# Patient Record
Sex: FEMALE | Race: BLACK OR AFRICAN AMERICAN | NOT HISPANIC OR LATINO | URBAN - METROPOLITAN AREA
[De-identification: names, ages, dates, MRNs, and addresses within clinical notes are randomized per-mention and may not be internally consistent; named-entity substitution may affect disease eponyms.]

---

## 2020-07-25 ENCOUNTER — TELEPHONE ENCOUNTER (OUTPATIENT)
Dept: URBAN - METROPOLITAN AREA CLINIC 13 | Facility: CLINIC | Age: 75
End: 2020-07-25

## 2020-07-25 RX ORDER — POLYETHYLENE GLYCOL 3350, SODIUM CHLORIDE, SODIUM BICARBONATE AND POTASSIUM CHLORIDE WITH LEMON FLAVOR 420; 11.2; 5.72; 1.48 G/4L; G/4L; G/4L; G/4L
USE AS DIRECTED POWDER, FOR SOLUTION ORAL
Qty: 1 | Refills: 0 | OUTPATIENT
Start: 2013-10-17 | End: 2013-11-11

## 2020-07-26 ENCOUNTER — TELEPHONE ENCOUNTER (OUTPATIENT)
Dept: URBAN - METROPOLITAN AREA CLINIC 13 | Facility: CLINIC | Age: 75
End: 2020-07-26

## 2020-07-26 RX ORDER — SOLIFENACIN SUCCINATE 10 MG/1
TABLET, FILM COATED ORAL
Qty: 30 | Refills: 0 | Status: ACTIVE | COMMUNITY
Start: 2012-01-10

## 2020-07-26 RX ORDER — METOPROLOL TARTRATE 25 MG/1
TABLET, FILM COATED ORAL
Qty: 60 | Refills: 0 | Status: ACTIVE | COMMUNITY
Start: 2013-02-22

## 2020-07-26 RX ORDER — TEMAZEPAM 30 MG/1
TAKE 1 CAPSULE AT BEDTIME AS NEEDED CAPSULE ORAL
Qty: 90 | Refills: 0 | Status: ACTIVE | COMMUNITY
Start: 2013-09-23

## 2020-07-26 RX ORDER — LOVASTATIN 40 MG/1
TABLET ORAL
Qty: 90 | Refills: 0 | Status: ACTIVE | COMMUNITY
Start: 2012-11-09

## 2020-07-26 RX ORDER — NITROGLYCERIN 0.4 MG/1
TABLET SUBLINGUAL
Qty: 25 | Refills: 0 | Status: ACTIVE | COMMUNITY
Start: 2012-10-23

## 2020-07-26 RX ORDER — LEVOTHYROXINE SODIUM 0.07 MG/1
TAKE 1 TABLET DAILY TABLET ORAL
Qty: 90 | Refills: 0 | Status: ACTIVE | COMMUNITY
Start: 2013-09-26

## 2020-07-26 RX ORDER — METOPROLOL TARTRATE 25 MG/1
TABLET, FILM COATED ORAL
Qty: 60 | Refills: 0 | Status: ACTIVE | COMMUNITY
Start: 2012-10-23

## 2020-07-26 RX ORDER — LEVOTHYROXINE SODIUM 125 UG/1
TABLET ORAL
Qty: 90 | Refills: 0 | Status: ACTIVE | COMMUNITY
Start: 2012-07-09

## 2021-02-10 ENCOUNTER — WEB ENCOUNTER (OUTPATIENT)
Dept: URBAN - METROPOLITAN AREA CLINIC 113 | Facility: CLINIC | Age: 76
End: 2021-02-10

## 2021-02-10 ENCOUNTER — OFFICE VISIT (OUTPATIENT)
Dept: URBAN - METROPOLITAN AREA CLINIC 113 | Facility: CLINIC | Age: 76
End: 2021-02-10
Payer: MEDICARE

## 2021-02-10 VITALS
HEIGHT: 60 IN | WEIGHT: 179.4 LBS | HEART RATE: 83 BPM | DIASTOLIC BLOOD PRESSURE: 86 MMHG | TEMPERATURE: 97.1 F | BODY MASS INDEX: 35.22 KG/M2 | SYSTOLIC BLOOD PRESSURE: 174 MMHG | RESPIRATION RATE: 22 BRPM

## 2021-02-10 DIAGNOSIS — R10.84 GENERALIZED ABDOMINAL PAIN: ICD-10-CM

## 2021-02-10 DIAGNOSIS — K80.20 CALCULUS OF GALLBLADDER WITHOUT CHOLECYSTITIS WITHOUT OBSTRUCTION: ICD-10-CM

## 2021-02-10 DIAGNOSIS — R11.0 NAUSEA: ICD-10-CM

## 2021-02-10 DIAGNOSIS — K21.9 GASTROESOPHAGEAL REFLUX DISEASE WITHOUT ESOPHAGITIS: ICD-10-CM

## 2021-02-10 DIAGNOSIS — K59.09 OTHER CONSTIPATION: ICD-10-CM

## 2021-02-10 PROCEDURE — 99204 OFFICE O/P NEW MOD 45 MIN: CPT | Performed by: NURSE PRACTITIONER

## 2021-02-10 PROCEDURE — G9903 PT SCRN TBCO ID AS NON USER: HCPCS | Performed by: NURSE PRACTITIONER

## 2021-02-10 PROCEDURE — G8482 FLU IMMUNIZE ORDER/ADMIN: HCPCS | Performed by: NURSE PRACTITIONER

## 2021-02-10 PROCEDURE — G8427 DOCREV CUR MEDS BY ELIG CLIN: HCPCS | Performed by: NURSE PRACTITIONER

## 2021-02-10 RX ORDER — METOPROLOL TARTRATE 25 MG/1
TAKE 1 TABLET TABLET, FILM COATED ORAL TWICE A DAY
Refills: 0 | Status: ACTIVE | COMMUNITY
Start: 2012-10-23

## 2021-02-10 RX ORDER — DICYCLOMINE HYDROCHLORIDE 10 MG/1
1 TABLET CAPSULE ORAL
Qty: 60 | Refills: 3 | OUTPATIENT
Start: 2021-02-10 | End: 2021-06-10

## 2021-02-10 RX ORDER — GABAPENTIN 300 MG/1
1 TABLET CAPSULE ORAL TWICE A DAY
Status: ACTIVE | COMMUNITY

## 2021-02-10 RX ORDER — CLOPIDOGREL 75 MG/1
1 TABLET TABLET, FILM COATED ORAL ONCE A DAY
Status: ACTIVE | COMMUNITY

## 2021-02-10 RX ORDER — NITROGLYCERIN 0.4 MG/1
TABLET SUBLINGUAL
Qty: 25 | Refills: 0 | Status: ACTIVE | COMMUNITY
Start: 2012-10-23

## 2021-02-10 RX ORDER — LINAGLIPTIN 5 MG/1
1 TABLET TABLET, FILM COATED ORAL ONCE A DAY
Status: ACTIVE | COMMUNITY

## 2021-02-10 RX ORDER — PANTOPRAZOLE SODIUM 40 MG/1
1 TABLET TABLET, DELAYED RELEASE ORAL TWICE DAILY
Qty: 60 | Refills: 3 | OUTPATIENT

## 2021-02-10 RX ORDER — AMLODIPINE BESYLATE 5 MG/1
1 TABLET TABLET ORAL ONCE A DAY
Status: ACTIVE | COMMUNITY

## 2021-02-10 RX ORDER — TRAZODONE HYDROCHLORIDE 100 MG/1
1 TABLET AT BEDTIME TABLET, FILM COATED ORAL ONCE A DAY
Status: ACTIVE | COMMUNITY

## 2021-02-10 RX ORDER — ZOLPIDEM TARTRATE 10 MG/1
1 TABLET AT BEDTIME AS NEEDED TABLET, FILM COATED ORAL ONCE A DAY
Status: ACTIVE | COMMUNITY

## 2021-02-10 RX ORDER — LEVOTHYROXINE SODIUM 0.09 MG/1
1 TABLET IN THE MORNING ON AN EMPTY STOMACH TABLET ORAL ONCE A DAY
Refills: 0 | Status: ACTIVE | COMMUNITY
Start: 2012-07-09

## 2021-02-10 RX ORDER — RANOLAZINE 500 MG/1
1 TABLET TABLET, EXTENDED RELEASE ORAL TWICE A DAY
Status: ACTIVE | COMMUNITY

## 2021-02-10 RX ORDER — POTASSIUM CHLORIDE 1500 MG/1
1 TABLET WITH FOOD TABLET, FILM COATED, EXTENDED RELEASE ORAL ONCE A DAY
Status: ACTIVE | COMMUNITY

## 2021-02-10 RX ORDER — OXYBUTYNIN CHLORIDE 10 MG/1
1 TABLET TABLET, EXTENDED RELEASE ORAL ONCE A DAY
Status: ACTIVE | COMMUNITY

## 2021-02-10 RX ORDER — LOVASTATIN 40 MG/1
1 TABLET WITH THE EVENING MEAL TABLET ORAL ONCE A DAY
Refills: 0 | Status: ACTIVE | COMMUNITY
Start: 2012-11-09

## 2021-02-10 NOTE — PHYSICAL EXAM GASTROINTESTINAL
Abdomen , soft, tender in the epigastrium and mildly tender across the lower abdomen, nondistended , no guarding or rigidity , no masses palpable , normal bowel sounds , Liver and Spleen , no hepatosplenomegaly

## 2021-02-10 NOTE — HPI-TODAY'S VISIT:
This is a 75-year-old woman with a history of hypertension, hyperlipidemia, hypothyroidism, type 2 diabetes, coronary artery disease status post CABG (2012) on Plavix, and anxiety/depression presenting for evaluation of abdominal pain. She was last seen in 2013 for abdominal pain, nausea, and a change in bowel habits.  She had been admitted to the hospital prior to her visit for evaluation of abdominal pain and had been diagnosed with celiac artery compression syndrome.  The clinical significance of this was unclear.  She had not experienced weight loss.  An ultrasound showed gallbladder sludge but her symptoms were not typical for biliary colic.  She was instructed to take daily fiber and was scheduled for a colonoscopy.  Records are outlined below. She reports onset of abdominal pain 4 months ago.  At first, her symptoms were intermittent and are now constant.  She indicates pain across her mid abdomen at the level of her umbilicus described as "a big cramp."  This radiates across her abdomen and into her back bilaterally.  Pain severity is an 8/10.  She reports constant pain that is exacerbated by eating.  There is no change with bowel movements, position, or activity.  She reports a sensation that "food does not want to digest" as evidenced by frequent belching.  She reports regurgitation and heartburn occurring intermittently on a daily basis.  This is not always associated with meals.  She was prescribed pantoprazole 40 mg daily at an emergency department visit on 1/3.  She reports mild improvement of heartburn but no improvement of abdominal pain.  She denies food lodging in her esophagus but reports a sensation that food moves slowly causing her to belch.  She has intermittent nausea without vomiting.  This is worse postprandially.  She was prescribed medication for nausea in the emergency department but reports this symptom is not severe enough to warrant medication.  She has a bowel movement every other day.  Her stools are soft and she denies straining.  She reports black bowel movements consistently for the last 4 months.  She admits bloating and gas.  She denies red blood per rectum.  Her weight has been stable.  She denies NSAID use. Labs 1/3/2021 CBC : WBC 8.35, hemoglobin 13.1, MCV 87.5, platelet 231.  BMP normal with exception of glucose 188.  LFTs: TB 0.3, ALP 92, ALT 7, AST 15.  Troponin less than 0.01.  Lipase 47.  Urinalysis normal with exception of an elevated specific gravity at 1.031 and elevated urine protein at 30. CT of the abdomen and pelvis with IV contrast 1/3/2021 : Unchanged enlarged distended gallbladder with multiple gallstones.  The gallbladder measures 4.1 cm in diameter.  This is unchanged from CT 1/25/2019.  No gallbladder wall thickening or pericholecystic fluid and no biliary dilation.  Right renal cyst measuring 1.3 cm unchanged.  Moderate constipation.  Decreased size of cystic mass in the pelvis measuring 4.4 cm resulting in a mass-effect on the urinary bladder (previously measured 5.1 cm).  Surgical changes of median sternotomy and hysterectomy.  No acute process.

## 2021-02-14 PROBLEM — 102614006: Status: ACTIVE | Noted: 2021-02-10

## 2021-02-14 PROBLEM — 70342003: Status: ACTIVE | Noted: 2021-02-10

## 2021-02-14 PROBLEM — 266435005: Status: ACTIVE | Noted: 2021-02-10

## 2021-02-14 PROBLEM — 422587007: Status: ACTIVE | Noted: 2021-02-10

## 2021-02-14 PROBLEM — 14760008: Status: ACTIVE | Noted: 2021-02-10

## 2021-03-03 ENCOUNTER — TELEPHONE ENCOUNTER (OUTPATIENT)
Dept: URBAN - METROPOLITAN AREA CLINIC 113 | Facility: CLINIC | Age: 76
End: 2021-03-03

## 2021-04-26 ENCOUNTER — OFFICE VISIT (OUTPATIENT)
Dept: URBAN - METROPOLITAN AREA CLINIC 113 | Facility: CLINIC | Age: 76
End: 2021-04-26

## 2021-06-28 ENCOUNTER — ERX REFILL RESPONSE (OUTPATIENT)
Dept: URBAN - METROPOLITAN AREA CLINIC 113 | Facility: CLINIC | Age: 76
End: 2021-06-28

## 2021-06-28 RX ORDER — PANTOPRAZOLE SODIUM 40 MG/1
1 TABLET TABLET, DELAYED RELEASE ORAL TWICE DAILY
Qty: 60 | Refills: 3 | OUTPATIENT

## 2021-06-28 RX ORDER — PANTOPRAZOLE SODIUM 40 MG/1
TAKE 1 TABLET BY MOUTH TWICE A DAY TABLET, DELAYED RELEASE ORAL
Qty: 180 TABLET | Refills: 5 | OUTPATIENT

## 2021-07-13 ENCOUNTER — ERX REFILL RESPONSE (OUTPATIENT)
Dept: URBAN - METROPOLITAN AREA CLINIC 113 | Facility: CLINIC | Age: 76
End: 2021-07-13

## 2021-07-13 RX ORDER — DICYCLOMINE HYDROCHLORIDE 10 MG/1
TAKE 1 CAPSULE BY MOUTH EVERY 6 HOURS AS NEEDED FOR ABDOMINAL PAIN CAPSULE ORAL
Qty: 180 CAPSULE | Refills: 2 | OUTPATIENT

## 2021-08-16 ENCOUNTER — OFFICE VISIT (OUTPATIENT)
Dept: URBAN - METROPOLITAN AREA CLINIC 113 | Facility: CLINIC | Age: 76
End: 2021-08-16

## 2022-02-09 ENCOUNTER — TELEPHONE ENCOUNTER (OUTPATIENT)
Dept: URBAN - METROPOLITAN AREA CLINIC 113 | Facility: CLINIC | Age: 77
End: 2022-02-09

## 2022-04-15 ENCOUNTER — DASHBOARD ENCOUNTERS (OUTPATIENT)
Age: 77
End: 2022-04-15

## 2022-04-15 ENCOUNTER — OFFICE VISIT (OUTPATIENT)
Dept: URBAN - METROPOLITAN AREA CLINIC 113 | Facility: CLINIC | Age: 77
End: 2022-04-15

## 2022-04-15 RX ORDER — PANTOPRAZOLE SODIUM 40 MG/1
TAKE 1 TABLET BY MOUTH TWICE A DAY TABLET, DELAYED RELEASE ORAL
Qty: 180 TABLET | Refills: 5 | Status: ACTIVE | COMMUNITY

## 2022-04-15 RX ORDER — TRAZODONE HYDROCHLORIDE 100 MG/1
1 TABLET AT BEDTIME TABLET, FILM COATED ORAL ONCE A DAY
Status: ACTIVE | COMMUNITY

## 2022-04-15 RX ORDER — ZOLPIDEM TARTRATE 10 MG/1
1 TABLET AT BEDTIME AS NEEDED TABLET, FILM COATED ORAL ONCE A DAY
Status: ACTIVE | COMMUNITY

## 2022-04-15 RX ORDER — GABAPENTIN 300 MG/1
1 TABLET CAPSULE ORAL TWICE A DAY
Status: ACTIVE | COMMUNITY

## 2022-04-15 RX ORDER — OXYBUTYNIN CHLORIDE 10 MG/1
1 TABLET TABLET, EXTENDED RELEASE ORAL ONCE A DAY
Status: ACTIVE | COMMUNITY

## 2022-04-15 RX ORDER — LINAGLIPTIN 5 MG/1
1 TABLET TABLET, FILM COATED ORAL ONCE A DAY
Status: ACTIVE | COMMUNITY

## 2022-04-15 RX ORDER — DICYCLOMINE HYDROCHLORIDE 10 MG/1
TAKE 1 CAPSULE BY MOUTH EVERY 6 HOURS AS NEEDED FOR ABDOMINAL PAIN CAPSULE ORAL
Qty: 180 CAPSULE | Refills: 2 | Status: ACTIVE | COMMUNITY

## 2022-04-15 RX ORDER — NITROGLYCERIN 0.4 MG/1
TABLET SUBLINGUAL
Qty: 25 | Refills: 0 | Status: ACTIVE | COMMUNITY
Start: 2012-10-23

## 2022-04-15 RX ORDER — LOVASTATIN 40 MG/1
1 TABLET WITH THE EVENING MEAL TABLET ORAL ONCE A DAY
Refills: 0 | Status: ACTIVE | COMMUNITY
Start: 2012-11-09

## 2022-04-15 RX ORDER — LEVOTHYROXINE SODIUM 0.09 MG/1
1 TABLET IN THE MORNING ON AN EMPTY STOMACH TABLET ORAL ONCE A DAY
Refills: 0 | Status: ACTIVE | COMMUNITY
Start: 2012-07-09

## 2022-04-15 RX ORDER — POTASSIUM CHLORIDE 1500 MG/1
1 TABLET WITH FOOD TABLET, FILM COATED, EXTENDED RELEASE ORAL ONCE A DAY
Status: ACTIVE | COMMUNITY

## 2022-04-15 RX ORDER — METOPROLOL TARTRATE 25 MG/1
TAKE 1 TABLET TABLET, FILM COATED ORAL TWICE A DAY
Refills: 0 | Status: ACTIVE | COMMUNITY
Start: 2012-10-23

## 2022-04-15 RX ORDER — RANOLAZINE 500 MG/1
1 TABLET TABLET, EXTENDED RELEASE ORAL TWICE A DAY
Status: ACTIVE | COMMUNITY

## 2022-04-15 RX ORDER — AMLODIPINE BESYLATE 5 MG/1
1 TABLET TABLET ORAL ONCE A DAY
Status: ACTIVE | COMMUNITY

## 2022-04-15 RX ORDER — CLOPIDOGREL 75 MG/1
1 TABLET TABLET, FILM COATED ORAL ONCE A DAY
Status: ACTIVE | COMMUNITY

## 2022-08-14 ENCOUNTER — ERX REFILL RESPONSE (OUTPATIENT)
Dept: URBAN - METROPOLITAN AREA CLINIC 113 | Facility: CLINIC | Age: 77
End: 2022-08-14

## 2022-08-14 RX ORDER — PANTOPRAZOLE SODIUM 40 MG/1
TAKE 1 TABLET BY MOUTH TWICE A DAY 90 TABLET, DELAYED RELEASE ORAL
Qty: 180 TABLET | Refills: 0 | OUTPATIENT

## 2022-08-14 RX ORDER — PANTOPRAZOLE SODIUM 40 MG/1
TAKE 1 TABLET BY MOUTH TWICE A DAY TABLET, DELAYED RELEASE ORAL
Qty: 180 TABLET | Refills: 5 | OUTPATIENT

## 2022-09-25 ENCOUNTER — ERX REFILL RESPONSE (OUTPATIENT)
Dept: URBAN - METROPOLITAN AREA CLINIC 113 | Facility: CLINIC | Age: 77
End: 2022-09-25

## 2022-09-25 RX ORDER — PANTOPRAZOLE SODIUM 40 MG/1
TAKE 1 TABLET BY MOUTH TWICE A DAY TABLET, DELAYED RELEASE ORAL
Qty: 180 TABLET | Refills: 0 | OUTPATIENT

## 2022-09-25 RX ORDER — PANTOPRAZOLE SODIUM 40 MG/1
1 TABLET TABLET, DELAYED RELEASE ORAL ONCE A DAY
Qty: 30 | Refills: 2 | OUTPATIENT

## 2022-11-01 ENCOUNTER — OFFICE VISIT (OUTPATIENT)
Dept: URBAN - METROPOLITAN AREA CLINIC 107 | Facility: CLINIC | Age: 77
End: 2022-11-01

## 2022-11-01 RX ORDER — CLOPIDOGREL 75 MG/1
1 TABLET TABLET, FILM COATED ORAL ONCE A DAY
Status: ACTIVE | COMMUNITY

## 2022-11-01 RX ORDER — OXYBUTYNIN CHLORIDE 10 MG/1
1 TABLET TABLET, EXTENDED RELEASE ORAL ONCE A DAY
Status: ACTIVE | COMMUNITY

## 2022-11-01 RX ORDER — LEVOTHYROXINE SODIUM 0.09 MG/1
1 TABLET IN THE MORNING ON AN EMPTY STOMACH TABLET ORAL ONCE A DAY
Refills: 0 | Status: ACTIVE | COMMUNITY
Start: 2012-07-09

## 2022-11-01 RX ORDER — METOPROLOL TARTRATE 25 MG/1
TAKE 1 TABLET TABLET, FILM COATED ORAL TWICE A DAY
Refills: 0 | Status: ACTIVE | COMMUNITY
Start: 2012-10-23

## 2022-11-01 RX ORDER — GABAPENTIN 300 MG/1
1 TABLET CAPSULE ORAL TWICE A DAY
Status: ACTIVE | COMMUNITY

## 2022-11-01 RX ORDER — LINAGLIPTIN 5 MG/1
1 TABLET TABLET, FILM COATED ORAL ONCE A DAY
Status: ACTIVE | COMMUNITY

## 2022-11-01 RX ORDER — POTASSIUM CHLORIDE 1500 MG/1
1 TABLET WITH FOOD TABLET, FILM COATED, EXTENDED RELEASE ORAL ONCE A DAY
Status: ACTIVE | COMMUNITY

## 2022-11-01 RX ORDER — AMLODIPINE BESYLATE 5 MG/1
1 TABLET TABLET ORAL ONCE A DAY
Status: ACTIVE | COMMUNITY

## 2022-11-01 RX ORDER — NITROGLYCERIN 0.4 MG/1
TABLET SUBLINGUAL
Qty: 25 | Refills: 0 | Status: ACTIVE | COMMUNITY
Start: 2012-10-23

## 2022-11-01 RX ORDER — ZOLPIDEM TARTRATE 10 MG/1
1 TABLET AT BEDTIME AS NEEDED TABLET, FILM COATED ORAL ONCE A DAY
Status: ACTIVE | COMMUNITY

## 2022-11-01 RX ORDER — DICYCLOMINE HYDROCHLORIDE 10 MG/1
TAKE 1 CAPSULE BY MOUTH EVERY 6 HOURS AS NEEDED FOR ABDOMINAL PAIN CAPSULE ORAL
Qty: 180 CAPSULE | Refills: 2 | Status: ACTIVE | COMMUNITY

## 2022-11-01 RX ORDER — RANOLAZINE 500 MG/1
1 TABLET TABLET, EXTENDED RELEASE ORAL TWICE A DAY
Status: ACTIVE | COMMUNITY

## 2022-11-01 RX ORDER — TRAZODONE HYDROCHLORIDE 100 MG/1
1 TABLET AT BEDTIME TABLET, FILM COATED ORAL ONCE A DAY
Status: ACTIVE | COMMUNITY

## 2022-11-01 RX ORDER — PANTOPRAZOLE SODIUM 40 MG/1
1 TABLET TABLET, DELAYED RELEASE ORAL ONCE A DAY
Qty: 30 | Refills: 2 | Status: ACTIVE | COMMUNITY

## 2022-11-01 RX ORDER — LOVASTATIN 40 MG/1
1 TABLET WITH THE EVENING MEAL TABLET ORAL ONCE A DAY
Refills: 0 | Status: ACTIVE | COMMUNITY
Start: 2012-11-09

## 2022-12-03 ENCOUNTER — ERX REFILL RESPONSE (OUTPATIENT)
Dept: URBAN - METROPOLITAN AREA CLINIC 113 | Facility: CLINIC | Age: 77
End: 2022-12-03

## 2022-12-03 RX ORDER — PANTOPRAZOLE SODIUM 40 MG/1
TAKE 1 TABLET BY MOUTH EVERY DAY FOR 30 DAYS TABLET, DELAYED RELEASE ORAL
Qty: 30 TABLET | Refills: 3 | OUTPATIENT

## 2022-12-03 RX ORDER — PANTOPRAZOLE SODIUM 40 MG/1
TAKE 1 TABLET BY MOUTH EVERY DAY FOR 30 DAYS TABLET, DELAYED RELEASE ORAL
Qty: 30 TABLET | Refills: 2 | OUTPATIENT